# Patient Record
Sex: MALE | Race: WHITE | URBAN - METROPOLITAN AREA
[De-identification: names, ages, dates, MRNs, and addresses within clinical notes are randomized per-mention and may not be internally consistent; named-entity substitution may affect disease eponyms.]

---

## 2020-11-15 ENCOUNTER — HOSPITAL ENCOUNTER (OUTPATIENT)
Dept: EMERGENCY MEDICINE | Facility: HOSPITAL | Age: 75
End: 2020-11-15
Attending: UROLOGY | Admitting: UROLOGY

## 2020-11-15 LAB
ABS NEUT (OLG): 5.24 X10(3)/MCL (ref 2.1–9.2)
APTT PPP: 36.7 SECOND(S) (ref 23.2–33.7)
BASOPHILS # BLD AUTO: 0 X10(3)/MCL (ref 0–0.2)
BASOPHILS NFR BLD AUTO: 0 %
BUN SERPL-MCNC: 19.4 MG/DL (ref 8.4–25.7)
CALCIUM SERPL-MCNC: 9.3 MG/DL (ref 8.8–10)
CHLORIDE SERPL-SCNC: 111 MMOL/L (ref 98–107)
CO2 SERPL-SCNC: 25 MMOL/L (ref 23–31)
CREAT SERPL-MCNC: 0.87 MG/DL (ref 0.73–1.18)
CREAT/UREA NIT SERPL: 22
EOSINOPHIL # BLD AUTO: 0 X10(3)/MCL (ref 0–0.9)
EOSINOPHIL NFR BLD AUTO: 0 %
ERYTHROCYTE [DISTWIDTH] IN BLOOD BY AUTOMATED COUNT: 14 % (ref 11.5–17)
GLUCOSE SERPL-MCNC: 119 MG/DL (ref 82–115)
HCT VFR BLD AUTO: 43.9 % (ref 42–52)
HGB BLD-MCNC: 13.3 GM/DL (ref 14–18)
INR PPP: 2.4 (ref 0–1.3)
LYMPHOCYTES # BLD AUTO: 1.6 X10(3)/MCL (ref 0.6–4.6)
LYMPHOCYTES NFR BLD AUTO: 21 %
MCH RBC QN AUTO: 29.2 PG (ref 27–31)
MCHC RBC AUTO-ENTMCNC: 30.3 GM/DL (ref 33–36)
MCV RBC AUTO: 96.5 FL (ref 80–94)
MONOCYTES # BLD AUTO: 0.8 X10(3)/MCL (ref 0.1–1.3)
MONOCYTES NFR BLD AUTO: 10 %
NEUTROPHILS # BLD AUTO: 5.24 X10(3)/MCL (ref 2.1–9.2)
NEUTROPHILS NFR BLD AUTO: 68 %
PLATELET # BLD AUTO: 229 X10(3)/MCL (ref 130–400)
PMV BLD AUTO: 10.3 FL (ref 9.4–12.4)
POTASSIUM SERPL-SCNC: 4.6 MMOL/L (ref 3.5–5.1)
PROTHROMBIN TIME: 25.6 SECOND(S) (ref 11.1–13.7)
RBC # BLD AUTO: 4.55 X10(6)/MCL (ref 4.7–6.1)
SODIUM SERPL-SCNC: 147 MMOL/L (ref 136–145)
WBC # SPEC AUTO: 7.7 X10(3)/MCL (ref 4.5–11.5)

## 2022-04-30 NOTE — H&P
CHIEF COMPLAINT:  Urinary retention.    HISTORY OF CLINICAL ILLNESS:  A 74-year-old man originally from [place] was visiting when he developed urinary retention.  By the patient's report, he had prostate cancer treatment with brachytherapy and radiation many years ago.  He also reports that his urologist recently performed cystoscopy, identified a urethral stricture and was unable to pass that scope into the bladder.  He has been unable to void for about 24 hours now.  He is in discomfort.  Denies any history of bleeding disorders.    REVIEW OF THE SYSTEMS:  Negative outside of what is in the HPI.    PAST MEDICAL HISTORY:  Prostate cancer, diabetes and hyperlipidemia.    PAST SURGICAL HISTORY:  Negative.    FAMILY HISTORY:  Noncontributory.    SOCIAL HISTORY:  Denies tobacco, alcohol or IV drug use.    MEDICATIONS:  Home medication list is reviewed.  The patient was also given Cipro by an outside facility.    ALLERGIES:  Lisinopril.    LABS:  Creatinine 0.8.  Sodium 147.  INR 2.4.  White count 7, H and H is 13 and 43.  Urinalysis without bacteria.    PHYSICAL EXAM:  VITALS:  Afebrile.  Vitals are stable outside of some systolic hypertension.   GENERAL:  Alert and oriented x3.  No apparent distress.     HEENT:  Normocephalic, atraumatic.   CHEST:  Clear to auscultation bilaterally.   CARDIAC:  Regular rate and rhythm.   ABDOMEN:  Soft, nontender, nondistended.   GENITOURINARY:  Normal external genitalia.   EXTREMITIES:  No clubbing, cyanosis, edema.    PROCEDURE:  Catheter placement/urethral dilation:  Initially I attempted to pass a 12-Omani Elder catheter.  That was unsuccessful.  I was then able to easily advance a guidewire under sterile conditions.  Over the guidewire, I was able to dilate from 8-Omani up to 18-Omani which was the highest dilator that was included in that kit.  I was then able to advance a 16-Omani Cahto tip catheter over the wire into the bladder.  We received back 1000 cc of green  urine, although the patient had been given a urinary analgesic.  Catheter was left in place.    ASSESSMENT:  Urinary retention/urethral stricture.    PLAN:    1. The patient and family can be discharged home with a Elder catheter.  They are going to travel back to [place], and they will call their primary urologist next week for evaluation.  2. They are going to fill the Cipro.  I did encourage him to go ahead and start taking that medication.  3. They were instructed in catheter and Elder care.  Call for any acute events.        ______________________________  MD GENNY Seymour/UY  DD:  11/15/2020  Time:  08:23AM  DT:  11/15/2020  Time:  08:42AM  Job #:  [id number]    The H&P was reviewed, the patient was examined, and the following changes to the patients condition are noted:  ______________________________________________________________________________  ______________________________________________________________________________  ______________________________________________________________________________  [  ] No changes to the patient's condition:      ______________________________                                             ___________________  PHYSICIAN SIGNATURE                                                             DATE/TIME

## 2022-04-30 NOTE — ED PROVIDER NOTES
Patient:   Grady Rea            MRN: [id number]            FIN: [id number]               Age:   74 years     Sex:  Male     :  1945   Associated Diagnoses:   Acute urinary retention; History of prostate cancer   Author:   Rosalind Tello MD      Basic Information   Time seen: Date & time 11/15/2020 04:15:00.   History source: Patient, spouse.   Arrival mode: Private vehicle.   History limitation: None.      History of Present Illness   The patient presents with   73 y/o CM with a hx of prostate cancer in  presents to the ED with wife at bedside for urinary retention since 1400 yesterday (2020). Of note, Pt is from [place] and is passing through here. Pt states he was seen at urgent care and did a urinalysis and reports no infection. Pt states he was discharged with Flomax and reports no relief prompting his visit to [facility] ED last night. Pt reports multiple attempts to place a catheter with no success and states he was told he needed to be admitted. Pt states he left because he thought he could make it to [place] to see his urologist there. Pt reports increasing uncomfortableness from his symptoms so he came here for evaluation. Wife reports 6 months ago he had a cystoscopy at his urologist's office with no success. States the camera could not pass because he had a urethral stricture. He denies prior episodes of urinary retention. .  The onset was 1400 yesterday (2020) .  The course/duration of symptoms is constant.  The character of symptoms is bladder fullness.  The degree of symptoms is moderate.  The exacerbating factor is none.  The relieving factor is none.  Risk factors consist of none.  Prior episodes: none.  Therapy today: Seen at urgent care and [facility] yesterday (2020) .  Associated symptoms: none.  Additional history: none.        Review of Systems   Constitutional symptoms:  No fever, no chills   Gastrointestinal symptoms:  Abdominal pain,  constipation, No vomiting,    Genitourinary symptoms:  Dysuria, inability to urinate, mild hematuria after multiple attempts at catheter placement at previous hospital.              Additional review of systems information: All other systems reviewed and otherwise negative.      Health Status   Allergies:    Allergic Reactions (Selected)  Severity Not Documented  Lisinopril- ...   Medications:  (Selected)   Prescriptions  Prescribed  Cipro 500 mg oral tablet: 500 mg = 1 tab(s), Oral, q12hr, X 7 day(s), # 14 tab(s), 0 Refill(s)  Documented Medications  Documented  Bydureon BCise 2 mg/0.85 mL subcutaneous suspension, extended release: 2 mg = 2 mg, Subcutaneous, qWeek  Simbrinza 1%- 0.2% ophthalmic suspension: 1 drop(s), OPTH, TID  Trulicity Pen 1.5 mg/0.5 mL subcutaneous solution: 1.5 mg, Subcutaneous, qWeek  atorvastatin 80 mg oral tablet: 80 mg = 1 tab(s), Oral, Daily  ezetimibe 10 mg oral tablet: 10 mg = 1 tab(s), Oral, Daily  furosemide 40 mg oral tablet: 40 mg = 1 tab(s), Oral, qAM  metFORMIN 1000 mg oral tablet: 1000 mg = 1 tab(s), Oral, BID.      Past Medical/ Family/ Social History   Medical history: Prostate cancer in 2005. .   Surgical history: Negative.   Family history: Not significant.   Social history: Tobacco use: Denies, Drug use: Denies.      Physical Examination               Vital Signs   Vital Signs   11/15/2020 3:23 CST      Temperature Oral          36.7 DegC                             Temperature Oral (calculated)             98.06 DegF                             Peripheral Pulse Rate     78 bpm                             Respiratory Rate          20 br/min                             SpO2                      97 %                             Oxygen Therapy            Room air                             Systolic Blood Pressure   166 mmHg  HI                             Diastolic Blood Pressure  84 mmHg  .   Measurements   11/14/2020 23:57 CST     Weight Dosing             138 kg                              Weight Measured and Calculated in Lbs     304.23 lb                             Weight Estimated          138 kg                             Height/Length Dosing      188 cm                             Height/Length Estimated   188 cm                             Body Mass Index Estimated 39.04 kg/m2  .   Basic Oxygen Information   11/15/2020 3:23 CST      SpO2                      97 %                             Oxygen Therapy            Room air    11/15/2020 2:00 CST      SpO2                      100 %                             Oxygen Therapy            Room air  General:  Alert, no acute distress, Appears mildly uncomfortable    Neurological:  Alert and oriented to person, place, time, and situation   Skin:  Warm, dry   Head:  Normocephalic, atraumatic   Neck:  Supple, trachea midline   Eye:  Normal conjunctiva   Ears, nose, mouth and throat:  Oral mucosa moist   Cardiovascular:  Normal peripheral perfusion   Respiratory:  Respirations are non-labored   Gastrointestinal:  Soft, Non distended, Normal bowel sounds, Tenderness: Suprapubic.    Psychiatric:  Cooperative      Medical Decision Making   Rationale:  Multiple attempts at catheter placement at previous facility today unsuccessful. Increasing lower abdominal pressure w/ bladder scan demonstrating > 650 cc urine in bladder. Patient relates history of failed clinic cystoscopy ~ 6 months ago due to stricturing. Discussed with urology on call, Dr. Berrios, who advised if previously failed bedside cystscopy and now unable to pass irving, likely needs OR for cystoscopy and urinary catheter placement. Findings and plan discussed with the patient, and he is agreeable to admission at this time.   .   Documents reviewed:  Emergency department nurses' notes.   Orders  Launch Order Profile (Selected)   Inpatient Orders  Ordered   Cart: 1, 11/15/20 4:55:00 CST  Perineal Care: 11/15/20 3:47:52 CST, BID  Urinary Catheter Insertion: 11/15/20 3:47:00 CST,  Indwelling, Yes, Acute urinary obstruction/retention  Urinary Catheter Monitorin/15/20 3:47:52 CST, BID  Urinary Catheter Nurse Driven Protocol: BID, 11/15/20 3:47:52 CST  Ordered (Dispatched)  BMP: Stat collect, 11/15/20 4:50:00 CST, Blood, Once, Stop date 11/15/20 4:50:00 CST, Lab Collect, Print Label By Order Location, 11/15/20 4:50:00 CST  CBC w/ Auto Diff: Stat collect, 11/15/20 4:50:00 CST, Blood, Once, Stop date 11/15/20 4:50:00 CST, Lab Collect, Print Label By Order Location, 11/15/20 4:50:00 CST.   Results review:  Lab results : [facility]   11/15/2020 1:50 CST      UA Appear                 Cloudy                             UA Color                  Brown                             UA Spec Grav              1.025                             UA Bili                   Moderate                             UA pH                     6.0                             UA Urobilinogen           0.2                             UA Blood                  Large                             UA Glucose                Negative                             UA Ketones                Negative                             UA Protein                100                             UA Nitrite                Negative                             UA Leuk Est               Negative                             UA WBC                    0-2 /HPF                             UA RBC                    50-74 /HPF                             UA Bacteria               None Seen                             UA Squam Epithelial       None Seen  , Lab results : [facility]   11/15/2020 6:45 CST      Est Creat Clearance Ser   86.99 mL/min    11/15/2020 5:23 CST      Sodium Lvl                147 mmol/L  HI                             Potassium Lvl             4.6 mmol/L                             Chloride                  111 mmol/L  HI                             CO2                       25 mmol/L                             Calcium Lvl                9.3 mg/dL                             Glucose Lvl               119 mg/dL  HI                             BUN                       19.4 mg/dL                             Creatinine                0.87 mg/dL                             BUN/Creat Ratio           22  NA                             eGFR-AA                   >60  NA                             eGFR-DENISHA                  >60 mL/min/1.73 m2  NA                             PT                        25.6 second(s)  HI                             INR                       2.4  HI                             PTT                       36.7 second(s)  HI                             WBC                       7.7 x10(3)/mcL                             RBC                       4.55 x10(6)/mcL  LOW                             Hgb                       13.3 gm/dL  LOW                             Hct                       43.9 %                             Platelet                  229 x10(3)/mcL                             MCV                       96.5 fL  HI                             MCH                       29.2 pg                             MCHC                      30.3 gm/dL  LOW                             RDW                       14.0 %                             MPV                       10.3 fL                             Abs Neut                  5.24 x10(3)/mcL                             Neutro Auto               68 %  NA                             Lymph Auto                21 %  NA                             Mono Auto                 10 %  NA                             Eos Auto                  0 %  NA                             Abs Eos                   0.0 x10(3)/mcL                             Basophil Auto             0 %  NA                             Abs Neutro                5.24 x10(3)/mcL                             Abs Lymph                 1.6 x10(3)/mcL                             Abs Mono                  0.8 x10(3)/mcL                              Abs Baso                  0.0 x10(3)/mcL  .       Impression and Plan   Diagnosis   Acute urinary retention (THE19-OT R33.8)   History of prostate cancer (SQG14-AV Z85.46)   Plan   Condition: Stable.    Disposition: Admit time  11/15/2020 05:12:00, Place in Observation Unit, Kevin Berrios MD.    Counseled: Patient, Family (Wife), Regarding diagnosis, Regarding diagnostic results, Regarding treatment plan, Patient indicated understanding of instructions, Wife understood. .    Notes: IJay, acted solely as a scribe for and in the presence of Dr. Tello who performed the service., I, Rosalind Tello, have independently performed the history, physical, medical decision making and procedures as documented above and agree with the scribe's documentation. .

## 2022-04-30 NOTE — DISCHARGE SUMMARY
DISCHARGE DATE:  11/15/2020    ADMISSION DIAGNOSIS:  Urinary retention.    HISTORY OF CLINICAL ILLNESS:  This is a 74-year-old man, history of prostate cancer treated with brachytherapy, went into urinary retention.    HOSPITAL COURSE:  He was admitted.  He underwent difficult Elder catheter placement over a guidewire and with some urethral dilation.  He had good urine output and was discharged home.    DISCHARGE DISPOSITION:  On discharge, the patient was stable, discharged home.    CHIEF COMPLAINT:  On discharge, chief complaint resolved.    DISCHARGE MEDICATIONS:  He will resume all home medicine also started Cipro.    FOLLOWUP PLAN:  He will be traveling back to [place] so he will follow up with his primary urologist, next week.        ______________________________  Kevin Berrios MD    /  DD:  11/15/2020  Time:  08:24AM  DT:  11/15/2020  Time:  08:33AM  Job #:  [id number]